# Patient Record
Sex: FEMALE
[De-identification: names, ages, dates, MRNs, and addresses within clinical notes are randomized per-mention and may not be internally consistent; named-entity substitution may affect disease eponyms.]

---

## 2024-08-09 PROBLEM — Z00.00 ENCOUNTER FOR PREVENTIVE HEALTH EXAMINATION: Status: ACTIVE | Noted: 2024-08-09

## 2024-08-21 ENCOUNTER — NON-APPOINTMENT (OUTPATIENT)
Age: 24
End: 2024-08-21

## 2024-08-23 ENCOUNTER — APPOINTMENT (OUTPATIENT)
Dept: INTERNAL MEDICINE | Facility: CLINIC | Age: 24
End: 2024-08-23
Payer: COMMERCIAL

## 2024-08-23 VITALS
SYSTOLIC BLOOD PRESSURE: 110 MMHG | BODY MASS INDEX: 22.71 KG/M2 | TEMPERATURE: 98.1 F | HEART RATE: 62 BPM | HEIGHT: 66.5 IN | DIASTOLIC BLOOD PRESSURE: 70 MMHG | WEIGHT: 143 LBS | RESPIRATION RATE: 16 BRPM | OXYGEN SATURATION: 100 %

## 2024-08-23 DIAGNOSIS — R10.11 RIGHT UPPER QUADRANT PAIN: ICD-10-CM

## 2024-08-23 DIAGNOSIS — K21.9 GASTRO-ESOPHAGEAL REFLUX DISEASE W/OUT ESOPHAGITIS: ICD-10-CM

## 2024-08-23 DIAGNOSIS — Z00.00 ENCOUNTER FOR GENERAL ADULT MEDICAL EXAMINATION W/OUT ABNORMAL FINDINGS: ICD-10-CM

## 2024-08-23 DIAGNOSIS — F41.9 ANXIETY DISORDER, UNSPECIFIED: ICD-10-CM

## 2024-08-23 PROCEDURE — 99385 PREV VISIT NEW AGE 18-39: CPT

## 2024-08-23 PROCEDURE — G0444 DEPRESSION SCREEN ANNUAL: CPT | Mod: 59

## 2024-08-23 PROCEDURE — 36415 COLL VENOUS BLD VENIPUNCTURE: CPT

## 2024-08-23 NOTE — HEALTH RISK ASSESSMENT
[Good] : ~his/her~  mood as  good [Yes] : Yes [Monthly or less (1 pt)] : Monthly or less (1 point) [1 or 2 (0 pts)] : 1 or 2 (0 points) [Never (0 pts)] : Never (0 points) [0] : 2) Feeling down, depressed, or hopeless: Not at all (0) [HIV test declined] : HIV test declined [Hepatitis C test declined] : Hepatitis C test declined [Never] : Never [[id number]] : 0 [PapSmearDate] : 8/20/24 [PapSmearComments] : results pending

## 2024-08-23 NOTE — HISTORY OF PRESENT ILLNESS
[FreeTextEntry1] :  for establish care and CPE [de-identified] : 23 year female came for establish care and CPE Pt has few medical issues: 1 c/o GERD sx self px PPI high dose, states felt mild improvement for few weeks, recently sx reoccur despite PPI tx  2 c/o episodic RUQ abd pain triggered by food, denies N/V/C/D, denies blood in stool asymp at present  3 h/o Anxiety f/u by Psychotherapist   Currently pt is not in acute distress, denies fever, headache, chest pain, palpitations, shortness of breath.

## 2024-08-26 ENCOUNTER — NON-APPOINTMENT (OUTPATIENT)
Age: 24
End: 2024-08-26

## 2024-08-26 ENCOUNTER — RESULT REVIEW (OUTPATIENT)
Age: 24
End: 2024-08-26

## 2024-08-26 LAB
ALBUMIN SERPL ELPH-MCNC: 4.7 G/DL
ALP BLD-CCNC: 71 U/L
ALT SERPL-CCNC: 18 U/L
ANION GAP SERPL CALC-SCNC: 12 MMOL/L
AST SERPL-CCNC: 23 U/L
BILIRUB SERPL-MCNC: 1.2 MG/DL
BUN SERPL-MCNC: 8 MG/DL
CALCIUM SERPL-MCNC: 9.8 MG/DL
CHLORIDE SERPL-SCNC: 103 MMOL/L
CHOLEST SERPL-MCNC: 154 MG/DL
CO2 SERPL-SCNC: 26 MMOL/L
CREAT SERPL-MCNC: 0.79 MG/DL
EGFR: 109 ML/MIN/1.73M2
ESTIMATED AVERAGE GLUCOSE: 114 MG/DL
GLUCOSE SERPL-MCNC: 83 MG/DL
HBA1C MFR BLD HPLC: 5.6 %
HCT VFR BLD CALC: 41.8 %
HDLC SERPL-MCNC: 49 MG/DL
HGB BLD-MCNC: 12.3 G/DL
LDLC SERPL CALC-MCNC: 89 MG/DL
MCHC RBC-ENTMCNC: 24.9 PG
MCHC RBC-ENTMCNC: 29.4 GM/DL
MCV RBC AUTO: 84.8 FL
NONHDLC SERPL-MCNC: 105 MG/DL
PLATELET # BLD AUTO: 350 K/UL
POTASSIUM SERPL-SCNC: 4.8 MMOL/L
PROT SERPL-MCNC: 7.5 G/DL
RBC # BLD: 4.93 M/UL
RBC # FLD: 16.2 %
SODIUM SERPL-SCNC: 141 MMOL/L
TRIGL SERPL-MCNC: 83 MG/DL
TSH SERPL-ACNC: 0.84 UIU/ML
WBC # FLD AUTO: 4.45 K/UL

## 2024-08-29 ENCOUNTER — APPOINTMENT (OUTPATIENT)
Dept: GASTROENTEROLOGY | Facility: CLINIC | Age: 24
End: 2024-08-29

## 2024-08-29 ENCOUNTER — NON-APPOINTMENT (OUTPATIENT)
Age: 24
End: 2024-08-29

## 2024-08-29 PROCEDURE — 99203 OFFICE O/P NEW LOW 30 MIN: CPT

## 2024-08-29 NOTE — PHYSICAL EXAM
[Alert] : alert [Normal Voice/Communication] : normal voice/communication [Healthy Appearing] : healthy appearing [Sclera] : the sclera and conjunctiva were normal

## 2024-09-03 NOTE — HISTORY OF PRESENT ILLNESS
[FreeTextEntry1] : This was a telehealth video visit.   Ivana Whitten is a 22 yo woman with a history of "mild PCOS" who requested this telehealth visit for the evaluation of epigastric pain that has been present intermittently since June 5, 2024. The pain is a burning sensation that is exacerbated by stressful situation. It was constant and she went to an Urgent Care Center where labs were normal, She was treated with omeprazole 40 mg qam and felt that her symptoms improved by July. However, the pain returned, and she took pantoprazole 40 mg qd that did not help much. She now has developed RUQ discomfort, also burning an unrelated to eating. She has no nausea, vomiting or early satiety. She has had no change in her bowel habit and typically has one formed BM qd. Her menstrual periods are normal and there has been no relationship between her symptoms and her menstrual cycle. She had a RUQ ultrasound on Friday that was unremarkable. She has had no weight loss, joint pains, skin rashes. There is no family history of colon cancer or IBD. She does not smoke and consumes EtOH once a month or less.   8/23/24:  Height 5 ft 6.5 in Weight 143 lb BMI Calculated 22.74 kg/m2  Labs done 8.23.24 	Sodium	141 mmol/L	 	135-145	  	Potassium	4.8 mmol/L	 	3.5-5.3	  	Chloride	103 mmol/L	 		  	CO2	26 mmol/L	 	22-31	  	Anion Gap, Serum	12 mmol/L	 	5-17	  	Glucose	83 mg/dL	 	70-99	  	BUN	8 mg/dL	 	7-23	  	Creatinine	0.79 mg/dL	 	0.50-1.30	  	Total Protein	7.5 g/dL	 	6.0-8.3	  	Albumin	4.7 g/dL	 	3.3-5.0	  	Calcium, Serum	9.8 mg/dL	 	8.4-10.5	  	Total Bilirubin	1.2 mg/dL	 	0.2-1.2	  	AST (SGOT)	23 U/L	 	10-40	  	ALT(SGPT)	18 U/L	 	10-45	  	ALK PHOS	71 U/L	 		  	eGFR	109 mL/min/1.73m2	 	>=60  WBC	4.45 K/uL	 	3.80-10.50	  	RBC Count	4.93 M/uL	 	3.80-5.20	  	HGB	12.3 g/dL	 	11.5-15.5	  	HCT	41.8 %	 	34.5-45.0	  	Mean Cell Volume	84.8 fl	 	80.0-100.0	 	Mean Cell Hemoglobin	24.9 pg	L	27.0-34.0	 	Mean Cell Hemoglobin Conc	29.4 gm/dL	L	32.0-36.0	 	Red Cell Distrib Width	16.2 %	H	10.3-14.5	  	PLT	350 K/uL	 	150-400	 TSH normal [de-identified] :   8/23/24 Liver: Normal in size. No focal or diffuse parenchymal abnormality.  Bile ducts: Normal in caliber. Common bile duct measures 1.5 mm.  Gallbladder: No intraluminal calcification, sludge, wall thickening, pericholecystic fluid, or tenderness to palpation.  Pancreas: Visualized portions are unremarkable.  Spleen: 9.0 cm. Normal in size and configuration.  Right kidney: 10.0 cm in length. No hydronephrosis.  Left kidney: 10.2 cm in length. No hydronephrosis.  Ascites: None.  Aorta and IVC: Visualized portions are within normal limits.

## 2024-09-03 NOTE — HISTORY OF PRESENT ILLNESS
[FreeTextEntry1] : This was a telehealth video visit.   Ivana Whitten is a 22 yo woman with a history of "mild PCOS" who requested this telehealth visit for the evaluation of epigastric pain that has been present intermittently since June 5, 2024. The pain is a burning sensation that is exacerbated by stressful situation. It was constant and she went to an Urgent Care Center where labs were normal, She was treated with omeprazole 40 mg qam and felt that her symptoms improved by July. However, the pain returned, and she took pantoprazole 40 mg qd that did not help much. She now has developed RUQ discomfort, also burning an unrelated to eating. She has no nausea, vomiting or early satiety. She has had no change in her bowel habit and typically has one formed BM qd. Her menstrual periods are normal and there has been no relationship between her symptoms and her menstrual cycle. She had a RUQ ultrasound on Friday that was unremarkable. She has had no weight loss, joint pains, skin rashes. There is no family history of colon cancer or IBD. She does not smoke and consumes EtOH once a month or less.   8/23/24:  Height 5 ft 6.5 in Weight 143 lb BMI Calculated 22.74 kg/m2  Labs done 8.23.24 	Sodium	141 mmol/L	 	135-145	  	Potassium	4.8 mmol/L	 	3.5-5.3	  	Chloride	103 mmol/L	 		  	CO2	26 mmol/L	 	22-31	  	Anion Gap, Serum	12 mmol/L	 	5-17	  	Glucose	83 mg/dL	 	70-99	  	BUN	8 mg/dL	 	7-23	  	Creatinine	0.79 mg/dL	 	0.50-1.30	  	Total Protein	7.5 g/dL	 	6.0-8.3	  	Albumin	4.7 g/dL	 	3.3-5.0	  	Calcium, Serum	9.8 mg/dL	 	8.4-10.5	  	Total Bilirubin	1.2 mg/dL	 	0.2-1.2	  	AST (SGOT)	23 U/L	 	10-40	  	ALT(SGPT)	18 U/L	 	10-45	  	ALK PHOS	71 U/L	 		  	eGFR	109 mL/min/1.73m2	 	>=60  WBC	4.45 K/uL	 	3.80-10.50	  	RBC Count	4.93 M/uL	 	3.80-5.20	  	HGB	12.3 g/dL	 	11.5-15.5	  	HCT	41.8 %	 	34.5-45.0	  	Mean Cell Volume	84.8 fl	 	80.0-100.0	 	Mean Cell Hemoglobin	24.9 pg	L	27.0-34.0	 	Mean Cell Hemoglobin Conc	29.4 gm/dL	L	32.0-36.0	 	Red Cell Distrib Width	16.2 %	H	10.3-14.5	  	PLT	350 K/uL	 	150-400	 TSH normal [de-identified] :   8/23/24 Liver: Normal in size. No focal or diffuse parenchymal abnormality.  Bile ducts: Normal in caliber. Common bile duct measures 1.5 mm.  Gallbladder: No intraluminal calcification, sludge, wall thickening, pericholecystic fluid, or tenderness to palpation.  Pancreas: Visualized portions are unremarkable.  Spleen: 9.0 cm. Normal in size and configuration.  Right kidney: 10.0 cm in length. No hydronephrosis.  Left kidney: 10.2 cm in length. No hydronephrosis.  Ascites: None.  Aorta and IVC: Visualized portions are within normal limits.

## 2024-09-03 NOTE — ASSESSMENT
[FreeTextEntry1] : This is a 20 yo woman with epigastric burning pain and RUQ discomfort that are exacerbated by stress. She did have transient improvement on a PPI. 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 H. pylori status could be assessed. Recommended a trial of pantoprazole 40 mg bid for two weeks. I will see her again at that time to reassess her symptoms. We discussed how functional dyspepsia could be addressed by cognitive behavioral therapy and/or amitriptyline. If she continues to have pain despite PPI, diagnostic EGD could be considered.

## 2024-09-10 ENCOUNTER — APPOINTMENT (OUTPATIENT)
Dept: GASTROENTEROLOGY | Facility: CLINIC | Age: 24
End: 2024-09-10

## 2024-09-10 DIAGNOSIS — Z00.00 ENCOUNTER FOR GENERAL ADULT MEDICAL EXAMINATION W/OUT ABNORMAL FINDINGS: ICD-10-CM

## 2024-09-10 DIAGNOSIS — R10.13 EPIGASTRIC PAIN: ICD-10-CM

## 2024-09-10 DIAGNOSIS — Z87.19 PERSONAL HISTORY OF OTHER DISEASES OF THE DIGESTIVE SYSTEM: ICD-10-CM

## 2024-09-10 DIAGNOSIS — R10.11 RIGHT UPPER QUADRANT PAIN: ICD-10-CM

## 2024-09-10 PROCEDURE — 99212 OFFICE O/P EST SF 10 MIN: CPT

## 2024-09-10 NOTE — REASON FOR VISIT
[Home] : at home, [unfilled] , at the time of the visit. [Medical Office: ([facility])___] : at the medical office located in  [Patient] : the patient [Self] : self [Follow-up] : a follow-up of an existing diagnosis

## 2024-09-10 NOTE — REASON FOR VISIT
[Home] : at home, [unfilled] , at the time of the visit. [Medical Office: ([facility])___] : at the medical office located in  [Patient] : the patient [Self] : self [Follow-up] : a follow-up of an existing diagnosis

## 2024-09-10 NOTE — ASSESSMENT
[FreeTextEntry1] : Dyspepsia - symptoms improved. She inquired about doing an EGD that I do not think is necessary at this time given the symptom improvement. She will continue PPI qd for two weeks and then prn thereafter. She will contact me if she desires a follow-up appt.

## 2024-09-10 NOTE — PHYSICAL EXAM
[Alert] : alert [Normal Voice/Communication] : normal voice/communication [Healthy Appearing] : healthy appearing [No Acute Distress] : no acute distress

## 2024-09-10 NOTE — HISTORY OF PRESENT ILLNESS
[FreeTextEntry1] : She has been feeling better. Taking PPI bid but sometimes forgets. Symptoms are no worse. She is not having GI symptoms that intefere with school or her social activities.

## 2025-01-27 ENCOUNTER — APPOINTMENT (OUTPATIENT)
Dept: GASTROENTEROLOGY | Facility: CLINIC | Age: 25
End: 2025-01-27
Payer: COMMERCIAL

## 2025-01-27 DIAGNOSIS — R10.13 EPIGASTRIC PAIN: ICD-10-CM

## 2025-01-27 PROCEDURE — 99213 OFFICE O/P EST LOW 20 MIN: CPT | Mod: 95

## 2025-01-27 PROCEDURE — G2211 COMPLEX E/M VISIT ADD ON: CPT | Mod: 95

## 2025-06-10 ENCOUNTER — APPOINTMENT (OUTPATIENT)
Dept: PSYCHIATRY | Facility: CLINIC | Age: 25
End: 2025-06-10
Payer: COMMERCIAL

## 2025-06-10 PROCEDURE — 90791 PSYCH DIAGNOSTIC EVALUATION: CPT | Mod: 95

## 2025-06-20 ENCOUNTER — TRANSCRIPTION ENCOUNTER (OUTPATIENT)
Age: 25
End: 2025-06-20